# Patient Record
(demographics unavailable — no encounter records)

---

## 2018-05-04 NOTE — ER DOCUMENT REPORT
HPI





- HPI


Patient complains to provider of: lip lac


Onset: Just prior to arrival


Onset/Duration: Sudden


Quality of pain: Achy


Pain Level: 3


Context: 





Patient was playing basketball and fell to the ground.  Patient is uncertain of 

what caused the laceration to his lip, stating it possibly could have been his 

tooth.  Patient with laceration to corner of his mouth on the left side.  

Patient without any nausea or vomiting.  Patient reports he might of had a 

brief loss of consciousness, but states that if so it was only for about 1 

second.


Associated Symptoms: Other - Lip laceration.  denies: Headache, Nausea, Vomiting


Exacerbated by: Denies


Relieved by: Denies


Similar symptoms previously: No


Recently seen / treated by doctor: No





- ROS


ROS below otherwise negative: Yes


Systems Reviewed and Negative: Yes All other systems reviewed and negative





- CONSTITUTIONAL


Constitutional: DENIES: Fever





- EENT


Notes: 





Lip laceration





- GASTROINTESTINAL


Gastrointestinal: DENIES: Nausea, Patient vomiting





- MUSCULOSKELETAL


Musculoskeletal: DENIES: Extremity pain, Back Pain, Neck Pain





- DERM


Skin Color: Normal


Skin Problems: Laceration





Past Medical History





- General


Information source: Patient, Parent





- Social History


Smoking Status: Never Smoker


Lives with: Family


Family History: Reviewed & Not Pertinent





- Medical History


Medical History: Negative


Surgical Hx: Negative





- Immunizations


Immunizations up to date: Yes





Vertical Provider Document





- CONSTITUTIONAL


Agree With Documented VS: Yes


Exam Limitations: No Limitations


General Appearance: WD/WN





- INFECTION CONTROL


TRAVEL OUTSIDE OF THE [place] IN LAST 30 DAYS: No





- HEENT


HEENT: Atraumatic, Normocephalic


Mouth Diagram: 


  __________________________














  __________________________





 1 - 1 cm lac








- NECK


Neck: Normal Inspection





- RESPIRATORY


Respiratory: Breath Sounds Normal, No Respiratory Distress





- CARDIOVASCULAR


Cardiovascular: Regular Rate, Regular Rhythm





- BACK


Back: Normal Inspection


Notes: 





No spinal tenderness step-off or deformity





- MUSCULOSKELETAL/EXTREMETIES


Musculoskeletal/Extremeties: MAEW, FROM





- NEURO


Level of Consciousness: Awake, Alert, Appropriate


Motor/Sensory: No Motor Deficit


Notes: 





No focal neurologic deficit





- DERM


Integumentary: Warm, Dry, Laceration - 1 cm laceration to corner of left side 

of mouth, area crosses vermilion border by 1 mm





Course





- Vital Signs


Vital signs: 


 











Temp Pulse Resp BP Pulse Ox


 


 97.9 F   83   18   125/71   98 


 


 05/04/18 17:28  05/04/18 17:28  05/04/18 17:28  05/04/18 17:28  05/04/18 17:28














Procedures





- Laceration/Wound Repair


  ** Left Face


Wound length (cm): 1


Wound's Depth, Shape: Irregular


Anesthetic type: 1% Lidocaine


Wound explored: Clean


Number of Sutures: 3 - 2 sutures with 6-0 chromic, one suture with 6-0 nylon


Layer Closure?: No


Post-procedure NV exam normal: Yes


Complications: No


Mouth/Teeth picture: 


  __________________________














  __________________________





 1 - 1 cm lac








Discharge





- Discharge


Clinical Impression: 


Lip laceration


Qualifiers:


 Encounter type: initial encounter Qualified Code(s): S01.511A - Laceration 

without foreign body of lip, initial encounter





Condition: Stable


Disposition: HOME, SELF-CARE


Instructions:  Augmentin ([facility]), Oral Laceration, Sutured ([facility])


Additional Instructions: 


Return immediately for any new or worsening symptoms





Followup with your primary care provider, call tomorrow to make a followup 

appointment





Suture removal in 5 days to single external suture


Prescriptions: 


Amox Tr/Potassium Clavulanate [Augmentin 875-125 Tablet] 1 tab PO BID 5 Days #

10 tablet


Referrals: 


KYLAH WHITEHEAD MD [Primary Care Provider] - Follow up as needed